# Patient Record
Sex: FEMALE | Race: WHITE | NOT HISPANIC OR LATINO | Employment: UNEMPLOYED | URBAN - METROPOLITAN AREA
[De-identification: names, ages, dates, MRNs, and addresses within clinical notes are randomized per-mention and may not be internally consistent; named-entity substitution may affect disease eponyms.]

---

## 2024-09-06 PROBLEM — G47.33 OSA ON CPAP: Status: ACTIVE | Noted: 2024-09-06

## 2024-09-06 PROBLEM — R73.03 PREDIABETES: Status: ACTIVE | Noted: 2021-02-08

## 2024-09-06 PROBLEM — K21.9 ACID REFLUX: Status: ACTIVE | Noted: 2024-09-06

## 2024-09-06 PROBLEM — R06.02 SOBOE (SHORTNESS OF BREATH ON EXERTION): Status: ACTIVE | Noted: 2024-08-27

## 2024-09-06 PROBLEM — E78.00 HYPERCHOLESTEREMIA: Status: ACTIVE | Noted: 2024-09-06

## 2024-09-06 PROBLEM — J84.9 INTERSTITIAL LUNG DISEASE (MULTI): Status: ACTIVE | Noted: 2024-09-06

## 2024-09-06 PROBLEM — R68.2 DRY MOUTH: Status: ACTIVE | Noted: 2024-09-06

## 2024-09-06 PROBLEM — I25.10 CAD (CORONARY ARTERY DISEASE): Status: ACTIVE | Noted: 2024-08-27

## 2024-09-06 PROBLEM — R79.89 ABNORMAL LFTS: Status: ACTIVE | Noted: 2024-09-06

## 2024-09-06 PROBLEM — R13.10 DYSPHAGIA: Status: ACTIVE | Noted: 2024-09-06

## 2024-09-06 PROBLEM — R42 DIZZY SPELLS: Status: ACTIVE | Noted: 2024-09-06

## 2024-09-06 PROBLEM — R00.0 SINUS TACHYCARDIA: Status: ACTIVE | Noted: 2024-09-06

## 2024-09-06 PROBLEM — R10.13 ABDOMINAL PAIN, EPIGASTRIC: Status: ACTIVE | Noted: 2024-09-06

## 2024-09-06 PROBLEM — K58.9 IBS (IRRITABLE BOWEL SYNDROME): Status: ACTIVE | Noted: 2024-09-06

## 2024-09-06 PROBLEM — R07.89 ATYPICAL CHEST PAIN: Status: ACTIVE | Noted: 2024-08-27

## 2024-09-06 PROBLEM — K76.0 NONALCOHOLIC FATTY LIVER: Status: ACTIVE | Noted: 2024-09-06

## 2024-09-06 PROBLEM — K63.5 POLYP OF COLON: Status: ACTIVE | Noted: 2024-09-06

## 2024-09-06 PROBLEM — R79.82 CRP ELEVATED: Status: ACTIVE | Noted: 2024-09-06

## 2024-09-06 PROBLEM — Z98.1 S/P LUMBAR SPINAL FUSION: Status: ACTIVE | Noted: 2018-12-14

## 2024-09-06 PROBLEM — E66.01 MORBID OBESITY (MULTI): Status: ACTIVE | Noted: 2024-09-06

## 2024-09-06 PROBLEM — E21.0 PRIMARY HYPERPARATHYROIDISM (MULTI): Status: ACTIVE | Noted: 2017-02-07

## 2024-09-09 ENCOUNTER — OFFICE VISIT (OUTPATIENT)
Dept: CARDIOLOGY | Facility: CLINIC | Age: 54
End: 2024-09-09
Payer: COMMERCIAL

## 2024-09-09 VITALS
SYSTOLIC BLOOD PRESSURE: 150 MMHG | HEART RATE: 94 BPM | DIASTOLIC BLOOD PRESSURE: 90 MMHG | WEIGHT: 257 LBS | OXYGEN SATURATION: 98 %

## 2024-09-09 DIAGNOSIS — I25.84 CORONARY ARTERY CALCIFICATION: ICD-10-CM

## 2024-09-09 DIAGNOSIS — I10 PRIMARY HYPERTENSION: ICD-10-CM

## 2024-09-09 DIAGNOSIS — R07.89 ATYPICAL CHEST PAIN: ICD-10-CM

## 2024-09-09 DIAGNOSIS — I25.10 CORONARY ARTERY CALCIFICATION: ICD-10-CM

## 2024-09-09 DIAGNOSIS — E78.00 HYPERCHOLESTEREMIA: Primary | ICD-10-CM

## 2024-09-09 PROBLEM — E66.1: Status: ACTIVE | Noted: 2024-09-09

## 2024-09-09 PROBLEM — E66.3 OVERWEIGHT: Status: ACTIVE | Noted: 2024-09-09

## 2024-09-09 PROCEDURE — 3077F SYST BP >= 140 MM HG: CPT | Performed by: INTERNAL MEDICINE

## 2024-09-09 PROCEDURE — 93010 ELECTROCARDIOGRAM REPORT: CPT | Performed by: INTERNAL MEDICINE

## 2024-09-09 PROCEDURE — 99204 OFFICE O/P NEW MOD 45 MIN: CPT | Performed by: INTERNAL MEDICINE

## 2024-09-09 PROCEDURE — 99214 OFFICE O/P EST MOD 30 MIN: CPT | Performed by: INTERNAL MEDICINE

## 2024-09-09 PROCEDURE — 93005 ELECTROCARDIOGRAM TRACING: CPT | Performed by: INTERNAL MEDICINE

## 2024-09-09 PROCEDURE — 3080F DIAST BP >= 90 MM HG: CPT | Performed by: INTERNAL MEDICINE

## 2024-09-09 RX ORDER — LISINOPRIL 10 MG/1
10 TABLET ORAL DAILY
Qty: 30 TABLET | Refills: 11 | Status: SHIPPED | OUTPATIENT
Start: 2024-09-09 | End: 2025-09-09

## 2024-09-09 RX ORDER — NAPROXEN SODIUM 220 MG/1
81 TABLET, FILM COATED ORAL DAILY
Qty: 30 TABLET | Refills: 11 | Status: SHIPPED | OUTPATIENT
Start: 2024-09-09 | End: 2025-09-09

## 2024-09-09 RX ORDER — LANOLIN ALCOHOL/MO/W.PET/CERES
CREAM (GRAM) TOPICAL
COMMUNITY
Start: 2019-10-24

## 2024-09-09 RX ORDER — VITAMIN B COMPLEX
1 CAPSULE ORAL DAILY
COMMUNITY

## 2024-09-09 RX ORDER — ATORVASTATIN CALCIUM 40 MG/1
40 TABLET, FILM COATED ORAL DAILY
Qty: 30 TABLET | Refills: 11 | Status: SHIPPED | OUTPATIENT
Start: 2024-09-09 | End: 2025-09-09

## 2024-09-09 RX ORDER — ACETAMINOPHEN 500 MG
TABLET ORAL
COMMUNITY
Start: 2019-06-06

## 2024-09-09 NOTE — PROGRESS NOTES
"Referred by Dr. Calle for New Patient Visit (Patient is here as a new patient)     History Of Present Illness:    Tiffanie Correa is a 54 y.o. female HLD/HTN  presenting with elevated CAC score.  Hz of chest pain -\"feels discomfort in her heart\"  Has dizziness/balance issues  Has numbness in hands  No SOB/palpitations/edema    She is active -walks      Past Medical History:  She has a past medical history of Personal history of other diseases of the circulatory system (07/11/2019).    Past Surgical History:  She has a past surgical history that includes Other surgical history (06/06/2019); Other surgical history (07/11/2019); and Other surgical history (07/11/2019).      Social History:  She reports that she has never smoked. She has never used smokeless tobacco. No history on file for alcohol use and drug use.    Family History:  Mother had CAD/atrial fib     Allergies:  Amlodipine and Metoprolol    Outpatient Medications:  Current Outpatient Medications   Medication Instructions    b complex vitamins capsule 1 capsule, oral, Daily    cholecalciferol (Vitamin D-3) 50 mcg (2,000 unit) capsule oral    magnesium oxide (Mag-Ox) 400 mg (241.3 mg magnesium) tablet oral    ubidecarenone/red yeast rice (CO Q10-RED YEAST RICE ORAL) oral        Last Recorded Vitals:  Vitals:    09/09/24 1535 09/09/24 1609   BP: (!) 144/104 150/90   BP Location: Right arm    Patient Position: Sitting    BP Cuff Size: Large adult    Pulse: 94    SpO2: 98%    Weight: 117 kg (257 lb)        Physical Exam:  Constitutional:       General: Awake.      Appearance: Healthy appearance. Not in distress. Obese.   Neck:      Vascular: No JVR. JVD normal.   Pulmonary:      Effort: Pulmonary effort is normal.      Breath sounds: Normal breath sounds. No wheezing. No rhonchi. No rales.   Chest:      Chest wall: Not tender to palpatation.   Cardiovascular:      PMI at left midclavicular line. Normal rate. Regular rhythm. Normal S1. Normal S2.       " "Murmurs: There is no murmur.      No gallop.  No click. No rub.   Pulses:     Intact distal pulses.   Edema:     Peripheral edema absent.   Abdominal:      General: Abdomen is protuberant. Bowel sounds are normal.      Palpations: Abdomen is soft.      Tenderness: There is no abdominal tenderness.   Musculoskeletal: Normal range of motion.         General: No tenderness. Skin:     General: Skin is warm and dry.   Neurological:      General: No focal deficit present.      Mental Status: Alert and oriented to person, place and time.            Last Labs:  CBC -  Lab Results   Component Value Date    WBC 6.3 2018    HGB 15.2 2018    HCT 46.0 2018    MCV 86 2018     2018       CMP -  Lab Results   Component Value Date    CALCIUM 9.0 2018    PROT 6.6 2018    ALBUMIN 4.1 2018    AST 17 2018    ALT 13 2018    ALKPHOS 57 2018    BILITOT 1.4 (H) 2018  Creatinine equals 0.8  Potassium equals 4.2    LIPID PANEL -2024  Total equals 254  Triglycerides equals 182  HDL equals 56  LDL equals 162    RENAL FUNCTION PANEL -   Lab Results   Component Value Date    GLUCOSE 108 (H) 2018     2018    K 4.1 2018     2018    CO2 29 2018    ANIONGAP 12 2018    BUN 8 2018    CREATININE 0.76 2018    CALCIUM 9.0 2018    ALBUMIN 4.1 2018        Lab Results   Component Value Date    HGBA1C 5.9 (H) 2020       Last Cardiology Tests:  ECG:  Normal sinus rhythm   rightward axis        Echo:  No results found for this or any previous visit from the past 1095 days.      Ejection Fractions:  No results found for: \"EF\"    Cath:  No results found for this or any previous visit from the past 1095 days.      Stress Test:  No results found for this or any previous visit from the past 1095 days.      Cardiac Imagin2024  Moderate coronary calcification left anterior descending " distribution/mild coronary calcification in the left circumflex distribution      Lab review: I have personally reviewed the laboratory result(s)     Assessment/Plan     Problem List Items Addressed This Visit       Atypical chest pain    Overview     Description atypical and no ischemic EKG changes but has CAC/HLD/family hz of CAD thus plan stress test         Hypercholesteremia - Primary    Relevant Orders    ECG 12 Lead (Completed)    Coronary artery calcification    Overview     75 ag units per 8/2024 study  Start ASA/statin  Check stress test  as has CP         Primary hypertension    Overview     BP elevated -has been on BP meds in the past but none now  Had side effects with losartan(?)  Start Lisinopril   7/2024 BMP OK           Weight loss  Take daily 81 mg aspirin   Start atorvastatin 40 mg daily for high cholesterol/coronary calcium  Start lisinopril 10 mg daily for BP  Exercise nuclear stress test=CP/CAC  Ted Calle, DO

## 2024-09-09 NOTE — PATIENT INSTRUCTIONS
Weight loss  Take daily 81 mg aspirin   Start atorvastatin 40 mg daily for high cholesterol/coronary calcium  Start lisinopril 10 mg daily for BP  Exercise nuclear stress test=CP/CAC

## 2024-09-20 ENCOUNTER — HOSPITAL ENCOUNTER (OUTPATIENT)
Dept: RADIOLOGY | Facility: CLINIC | Age: 54
Discharge: HOME | End: 2024-09-20
Payer: COMMERCIAL

## 2024-09-20 ENCOUNTER — HOSPITAL ENCOUNTER (OUTPATIENT)
Dept: CARDIOLOGY | Facility: CLINIC | Age: 54
Discharge: HOME | End: 2024-09-20
Payer: COMMERCIAL

## 2024-09-20 DIAGNOSIS — R07.89 ATYPICAL CHEST PAIN: ICD-10-CM

## 2024-09-20 DIAGNOSIS — I25.10 ATHEROSCLEROTIC HEART DISEASE OF NATIVE CORONARY ARTERY WITHOUT ANGINA PECTORIS: ICD-10-CM

## 2024-09-20 PROCEDURE — 78452 HT MUSCLE IMAGE SPECT MULT: CPT

## 2024-09-20 PROCEDURE — 93018 CV STRESS TEST I&R ONLY: CPT | Performed by: INTERNAL MEDICINE

## 2024-09-20 PROCEDURE — 3430000001 HC RX 343 DIAGNOSTIC RADIOPHARMACEUTICALS: Performed by: INTERNAL MEDICINE

## 2024-09-20 PROCEDURE — 93016 CV STRESS TEST SUPVJ ONLY: CPT | Performed by: INTERNAL MEDICINE

## 2024-09-20 PROCEDURE — A9502 TC99M TETROFOSMIN: HCPCS | Performed by: INTERNAL MEDICINE

## 2024-09-20 PROCEDURE — 93017 CV STRESS TEST TRACING ONLY: CPT

## 2024-09-24 ENCOUNTER — OFFICE VISIT (OUTPATIENT)
Dept: CARDIOLOGY | Facility: CLINIC | Age: 54
End: 2024-09-24
Payer: COMMERCIAL

## 2024-09-24 VITALS
HEIGHT: 64 IN | WEIGHT: 255 LBS | BODY MASS INDEX: 43.54 KG/M2 | OXYGEN SATURATION: 96 % | SYSTOLIC BLOOD PRESSURE: 148 MMHG | HEART RATE: 85 BPM | DIASTOLIC BLOOD PRESSURE: 90 MMHG

## 2024-09-24 DIAGNOSIS — I25.84 CORONARY ARTERY CALCIFICATION: Primary | ICD-10-CM

## 2024-09-24 DIAGNOSIS — I25.10 CORONARY ARTERY CALCIFICATION: Primary | ICD-10-CM

## 2024-09-24 DIAGNOSIS — R07.89 ATYPICAL CHEST PAIN: ICD-10-CM

## 2024-09-24 DIAGNOSIS — E66.3 OVERWEIGHT: ICD-10-CM

## 2024-09-24 DIAGNOSIS — I10 PRIMARY HYPERTENSION: ICD-10-CM

## 2024-09-24 PROBLEM — R10.32 LEFT LOWER QUADRANT ABDOMINAL PAIN: Status: ACTIVE | Noted: 2024-09-24

## 2024-09-24 PROBLEM — K27.9 PEPTIC ULCER: Status: ACTIVE | Noted: 2024-09-24

## 2024-09-24 PROBLEM — E78.5 HYPERLIPIDEMIA: Status: ACTIVE | Noted: 2024-09-06

## 2024-09-24 PROBLEM — E66.1: Status: RESOLVED | Noted: 2024-09-09 | Resolved: 2024-09-24

## 2024-09-24 PROCEDURE — 99213 OFFICE O/P EST LOW 20 MIN: CPT | Performed by: INTERNAL MEDICINE

## 2024-09-24 PROCEDURE — 1036F TOBACCO NON-USER: CPT | Performed by: INTERNAL MEDICINE

## 2024-09-24 PROCEDURE — 3008F BODY MASS INDEX DOCD: CPT | Performed by: INTERNAL MEDICINE

## 2024-09-24 PROCEDURE — 3079F DIAST BP 80-89 MM HG: CPT | Performed by: INTERNAL MEDICINE

## 2024-09-24 PROCEDURE — 3075F SYST BP GE 130 - 139MM HG: CPT | Performed by: INTERNAL MEDICINE

## 2024-09-24 NOTE — PROGRESS NOTES
"Chief Complaint:   Follow-up (Stress test results)     History Of Present Illness:    Tiffanie Correa is a 54 y.o. female presentin after testing.  Minimal chest pain  Patient denies SOB/palpitations/dizziness/lightheadedness/edema/claudication  Active-walks 20 minutes     Could not tolerate atorvastatin-GI issues  Not taking lisinopril   Last Recorded Vitals:  Vitals:    09/24/24 1126 09/24/24 1148   BP: 136/88 148/90   BP Location: Right arm    Patient Position: Sitting    BP Cuff Size: Adult    Pulse: 85    SpO2: 96%    Weight: 116 kg (255 lb)    Height: 1.626 m (5' 4\")             Allergies:  Amlodipine and Metoprolol    Outpatient Medications:  Current Outpatient Medications   Medication Instructions    aspirin 81 mg, oral, Daily    b complex vitamins capsule 1 capsule, oral, Daily    cholecalciferol (Vitamin D-3) 50 mcg (2,000 unit) capsule oral    magnesium oxide (Mag-Ox) 400 mg (241.3 mg magnesium) tablet oral    ubidecarenone/red yeast rice (CO Q10-RED YEAST RICE ORAL) oral       Physical Exam:  Constitutional:       General: Awake.      Appearance: Healthy appearance. Not in distress. Obese.   Neck:      Vascular: No JVR. JVD normal.   Pulmonary:      Effort: Pulmonary effort is normal.      Breath sounds: Normal breath sounds. No wheezing. No rhonchi. No rales.   Chest:      Chest wall: Not tender to palpatation.   Cardiovascular:      PMI at left midclavicular line. Normal rate. Regular rhythm. Normal S1. Normal S2.       Murmurs: There is no murmur.      No gallop.  No click. No rub.   Pulses:     Intact distal pulses.   Edema:     Peripheral edema absent.   Abdominal:      General: Abdomen is protuberant. Bowel sounds are normal.      Palpations: Abdomen is soft.      Tenderness: There is no abdominal tenderness.   Musculoskeletal: Normal range of motion.         General: No tenderness. Skin:     General: Skin is warm and dry.   Neurological:      General: No focal deficit present.      Mental Status: " "Alert and oriented to person, place and time.          Last Labs:  CBC -  Lab Results   Component Value Date    WBC 6.3 11/21/2018    HGB 15.2 11/21/2018    HCT 46.0 11/21/2018    MCV 86 11/21/2018     11/21/2018       CMP -  Lab Results   Component Value Date    CALCIUM 9.0 11/21/2018    PROT 6.6 11/21/2018    ALBUMIN 4.1 11/21/2018    AST 17 11/21/2018    ALT 13 11/21/2018    ALKPHOS 57 11/21/2018    BILITOT 1.4 (H) 11/21/2018       LIPID PANEL -   No results found for: \"CHOL\", \"TRIG\", \"HDL\", \"CHHDL\", \"LDLDIRECT\", \"VLDL\"           RENAL FUNCTION PANEL -   Lab Results   Component Value Date    GLUCOSE 108 (H) 11/21/2018     11/21/2018    K 4.1 11/21/2018     11/21/2018    CO2 29 11/21/2018    ANIONGAP 12 11/21/2018    BUN 8 11/21/2018    CREATININE 0.76 11/21/2018    CALCIUM 9.0 11/21/2018    ALBUMIN 4.1 11/21/2018        Lab Results   Component Value Date    HGBA1C 5.9 (H) 08/21/2020                 Lab review: I have personally reviewed the laboratory result(s)        Problem List Items Addressed This Visit       Atypical chest pain    Overview     Description atypical and no ischemic EKG changes but has CAC/HLD/family hz of CAD thus did stress test  9/2024 nuclear stress test with average exercise tolerance with no ischemic EKG changes and NL perfusion/EF  Suspect NON cardiac etiology         Coronary artery calcification - Primary    Overview     75 ag units per 8/2024 study  Started ASA/statin  Pt stopped statin(GI issues)  9/2023 stress test OK         Primary hypertension    Overview     BP elevated -had been on BP meds in the past   Had side effects with losartan(?)  Started Lisinopril 9/2024 OV  Stopped taking  Discussed weight loss/exercise/salt restriction  7/2024 BMP OK         Overweight    Overview     Discussed weight loss        Walk 30 minutes daily  Heart healthy diet=more plants the better  Weight loss  Watch salt intake    Ted Calle, DO  "

## 2024-11-05 ENCOUNTER — APPOINTMENT (OUTPATIENT)
Dept: CARDIOLOGY | Facility: CLINIC | Age: 54
End: 2024-11-05
Payer: COMMERCIAL